# Patient Record
Sex: FEMALE | Race: WHITE | HISPANIC OR LATINO
[De-identification: names, ages, dates, MRNs, and addresses within clinical notes are randomized per-mention and may not be internally consistent; named-entity substitution may affect disease eponyms.]

---

## 2023-10-12 ENCOUNTER — DASHBOARD ENCOUNTERS (OUTPATIENT)
Age: 33
End: 2023-10-12

## 2023-10-12 ENCOUNTER — LAB OUTSIDE AN ENCOUNTER (OUTPATIENT)
Dept: URBAN - METROPOLITAN AREA CLINIC 68 | Facility: CLINIC | Age: 33
End: 2023-10-12

## 2023-10-12 ENCOUNTER — OFFICE VISIT (OUTPATIENT)
Dept: URBAN - METROPOLITAN AREA CLINIC 68 | Facility: CLINIC | Age: 33
End: 2023-10-12
Payer: COMMERCIAL

## 2023-10-12 VITALS
OXYGEN SATURATION: 99 % | HEIGHT: 65 IN | SYSTOLIC BLOOD PRESSURE: 116 MMHG | BODY MASS INDEX: 24.16 KG/M2 | DIASTOLIC BLOOD PRESSURE: 72 MMHG | WEIGHT: 145 LBS | HEART RATE: 67 BPM

## 2023-10-12 DIAGNOSIS — R79.89 ABNORMAL LFTS: ICD-10-CM

## 2023-10-12 DIAGNOSIS — R10.11 RUQ ABDOMINAL PAIN: ICD-10-CM

## 2023-10-12 DIAGNOSIS — K52.9 ENTEROCOLITIS: ICD-10-CM

## 2023-10-12 DIAGNOSIS — R31.9 HEMATURIA, UNSPECIFIED: ICD-10-CM

## 2023-10-12 DIAGNOSIS — Z83.719 FAMILY HISTORY OF POLYPS IN THE COLON: ICD-10-CM

## 2023-10-12 DIAGNOSIS — K82.4 GALLBLADDER POLYP: ICD-10-CM

## 2023-10-12 DIAGNOSIS — R10.12 LUQ ABDOMINAL PAIN: ICD-10-CM

## 2023-10-12 PROBLEM — 34436003: Status: ACTIVE | Noted: 2023-10-12

## 2023-10-12 PROCEDURE — 99204 OFFICE O/P NEW MOD 45 MIN: CPT | Performed by: SPECIALIST

## 2023-10-12 RX ORDER — ONDANSETRON 8 MG/1
TABLET, ORALLY DISINTEGRATING ORAL
Qty: 12 TABLET | Refills: 0 | Status: ON HOLD | COMMUNITY

## 2023-10-12 RX ORDER — DICYCLOMINE HYDROCHLORIDE 20 MG/1
TABLET ORAL
Qty: 20 EACH | Refills: 0 | Status: ON HOLD | COMMUNITY

## 2023-10-12 RX ORDER — AZITHROMYCIN 500 MG/1
TABLET, FILM COATED ORAL
Qty: 3 TABLET | Refills: 0 | Status: ON HOLD | COMMUNITY

## 2023-10-12 RX ORDER — PROMETHAZINE HYDROCHLORIDE 25 MG/1
SUPPOSITORY RECTAL
Qty: 12 EACH | Refills: 0 | Status: ON HOLD | COMMUNITY

## 2023-10-12 RX ORDER — PROMETHAZINE HYDROCHLORIDE 25 MG/1
SUPPOSITORY RECTAL
Qty: 12 SUPPOSITORIES | Refills: 0 | Status: ON HOLD | COMMUNITY

## 2023-10-12 RX ORDER — DEXTROAMPHETAMINE SACCHARATE, AMPHETAMINE ASPARTATE MONOHYDRATE, DEXTROAMPHETAMINE SULFATE AND AMPHETAMINE SULFATE 2.5; 2.5; 2.5; 2.5 MG/1; MG/1; MG/1; MG/1
TABLET ORAL
Qty: 30 TABLET | Status: ACTIVE | COMMUNITY

## 2023-10-12 RX ORDER — FAMOTIDINE 20 MG/1
1 TABLET TABLET, FILM COATED ORAL TWICE A DAY
Qty: 60 TABLET | Refills: 11 | OUTPATIENT
Start: 2023-10-12

## 2023-10-12 RX ORDER — DICYCLOMINE HYDROCHLORIDE 20 MG/1
TABLET ORAL
Qty: 20 TABLET | Refills: 0 | Status: ON HOLD | COMMUNITY

## 2023-10-12 RX ORDER — BUPROPION HYDROCHLORIDE 100 MG/1
TABLET, EXTENDED RELEASE ORAL
Qty: 90 TABLET | Status: ACTIVE | COMMUNITY

## 2023-10-12 NOTE — HPI-TODAY'S VISIT:
The patient is 33 years old returned from [facility] after tripping developed significant nausea vomiting diarrhea and fever.  She was admitted to the hospital for 1 day for observation testing revealed CT consistent with colitis and the right-sided testing was positive for enterovirus or rhinovirus.  Her symptoms improved.  Additional testing including ultrasound revealed a cholesterol polyp but no gallstones.  Elevated liver function tests were noted.Patient has some persistent discomfort although she is improved.  Last night she had pain at 1 AM which resolved.Patient has family history of colon polyps in both grandmothersHer values have returned to normal with some constipationPain is described in the right upper and right lower quadrant with some radiation to the back IMPRESSIONRule out hepatobiliary etiology retained common bile duct stone symptomatic or a calculus cholecystitis.  Pain currently not classic for biliary colic no clear indication for cholecystectomyRule out peptic ulcer disease H. pylori or other etiology for persistent pain.Rule out Giardia another person in her traveling party did require this PLANStool testingUrinalysisTSHUpper endoscopy if pain does not improve in 2 weeksPepcid once or twice daily as needed for indigestionFollow-up evaluation to determine if persistent recurrent biliary colic is indication for cholecystectomy

## 2023-10-20 ENCOUNTER — LAB OUTSIDE AN ENCOUNTER (OUTPATIENT)
Dept: URBAN - METROPOLITAN AREA CLINIC 68 | Facility: CLINIC | Age: 33
End: 2023-10-20

## 2023-10-21 LAB
ALBUMIN/GLOBULIN RATIO: 1.6
ALBUMIN: 4.7
ALKALINE PHOSPHATASE: 73
ALT (SGPT): 84
APPEARANCE: CLEAR
AST (SGOT): 60
BILIRUBIN, DIRECT: 0.1
BILIRUBIN, INDIRECT: 0.2
BILIRUBIN, TOTAL: 0.3
BILIRUBIN: NEGATIVE
COLOR: YELLOW
GLOBULIN: 3
GLUCOSE: NEGATIVE
HBSAG SCREEN: (no result)
HEP A AB, IGM: (no result)
HEP B CORE AB, IGM: (no result)
HEPATITIS C ANTIBODY: (no result)
KETONES: NEGATIVE
LEUKOCYTE ESTERASE: NEGATIVE
NITRITE: NEGATIVE
OCCULT BLOOD: NEGATIVE
PH: 8
PROTEIN, TOTAL: 7.7
PROTEIN: NEGATIVE
SPECIFIC GRAVITY: 1.01
TSH: 2.12

## 2023-11-10 ENCOUNTER — TELEPHONE ENCOUNTER (OUTPATIENT)
Dept: URBAN - METROPOLITAN AREA CLINIC 68 | Facility: CLINIC | Age: 33
End: 2023-11-10